# Patient Record
Sex: MALE | Race: WHITE | Employment: FULL TIME | URBAN - METROPOLITAN AREA
[De-identification: names, ages, dates, MRNs, and addresses within clinical notes are randomized per-mention and may not be internally consistent; named-entity substitution may affect disease eponyms.]

---

## 2018-05-15 PROCEDURE — 88305 TISSUE EXAM BY PATHOLOGIST: CPT | Performed by: INTERNAL MEDICINE

## 2019-01-28 ENCOUNTER — OFFICE VISIT (OUTPATIENT)
Dept: INTERNAL MEDICINE CLINIC | Facility: CLINIC | Age: 39
End: 2019-01-28
Payer: COMMERCIAL

## 2019-01-28 ENCOUNTER — APPOINTMENT (OUTPATIENT)
Dept: CT IMAGING | Facility: HOSPITAL | Age: 39
End: 2019-01-28
Attending: EMERGENCY MEDICINE
Payer: COMMERCIAL

## 2019-01-28 ENCOUNTER — HOSPITAL ENCOUNTER (EMERGENCY)
Facility: HOSPITAL | Age: 39
Discharge: HOME | End: 2019-01-28
Attending: EMERGENCY MEDICINE
Payer: COMMERCIAL

## 2019-01-28 VITALS
DIASTOLIC BLOOD PRESSURE: 80 MMHG | RESPIRATION RATE: 14 BRPM | SYSTOLIC BLOOD PRESSURE: 118 MMHG | OXYGEN SATURATION: 97 % | TEMPERATURE: 98 F | HEIGHT: 73 IN | HEART RATE: 73 BPM | BODY MASS INDEX: 27.7 KG/M2 | WEIGHT: 209 LBS

## 2019-01-28 VITALS
SYSTOLIC BLOOD PRESSURE: 134 MMHG | OXYGEN SATURATION: 100 % | HEART RATE: 72 BPM | DIASTOLIC BLOOD PRESSURE: 90 MMHG | TEMPERATURE: 98 F | RESPIRATION RATE: 16 BRPM

## 2019-01-28 DIAGNOSIS — R10.9 LEFT FLANK PAIN, CHRONIC: Primary | ICD-10-CM

## 2019-01-28 DIAGNOSIS — R10.12 ACUTE ABDOMINAL PAIN IN LEFT UPPER QUADRANT: Primary | ICD-10-CM

## 2019-01-28 DIAGNOSIS — G89.29 LEFT FLANK PAIN, CHRONIC: Primary | ICD-10-CM

## 2019-01-28 PROBLEM — F10.10 ALCOHOL ABUSE: Status: ACTIVE | Noted: 2019-01-28

## 2019-01-28 LAB
ALBUMIN SERPL-MCNC: 4.3 G/DL (ref 3.1–4.5)
ALBUMIN/GLOB SERPL: 1.3 {RATIO} (ref 1–2)
ALP LIVER SERPL-CCNC: 81 U/L (ref 45–117)
ALT SERPL-CCNC: 42 U/L (ref 17–63)
ANION GAP SERPL CALC-SCNC: 6 MMOL/L (ref 0–18)
AST SERPL-CCNC: 19 U/L (ref 15–41)
BASOPHILS # BLD AUTO: 0.03 X10(3) UL (ref 0–0.1)
BASOPHILS NFR BLD AUTO: 0.6 %
BILIRUB SERPL-MCNC: 0.4 MG/DL (ref 0.1–2)
BILIRUB UR QL STRIP.AUTO: NEGATIVE
BUN BLD-MCNC: 16 MG/DL (ref 8–20)
BUN/CREAT SERPL: 16.2 (ref 10–20)
CALCIUM BLD-MCNC: 8.9 MG/DL (ref 8.3–10.3)
CHLORIDE SERPL-SCNC: 108 MMOL/L (ref 101–111)
CLARITY UR REFRACT.AUTO: CLEAR
CO2 SERPL-SCNC: 26 MMOL/L (ref 22–32)
COLOR UR AUTO: YELLOW
CREAT BLD-MCNC: 0.99 MG/DL (ref 0.7–1.3)
EOSINOPHIL # BLD AUTO: 0.22 X10(3) UL (ref 0–0.3)
EOSINOPHIL NFR BLD AUTO: 4.1 %
ERYTHROCYTE [DISTWIDTH] IN BLOOD BY AUTOMATED COUNT: 11.9 % (ref 11.5–16)
GLOBULIN PLAS-MCNC: 3.2 G/DL (ref 2.8–4.4)
GLUCOSE BLD-MCNC: 94 MG/DL (ref 70–99)
GLUCOSE UR STRIP.AUTO-MCNC: NEGATIVE MG/DL
HCT VFR BLD AUTO: 40 % (ref 37–53)
HGB BLD-MCNC: 14.2 G/DL (ref 13–17)
IMMATURE GRANULOCYTE COUNT: 0.02 X10(3) UL (ref 0–1)
IMMATURE GRANULOCYTE RATIO %: 0.4 %
KETONES UR STRIP.AUTO-MCNC: NEGATIVE MG/DL
LEUKOCYTE ESTERASE UR QL STRIP.AUTO: NEGATIVE
LIPASE: 72 U/L (ref 73–393)
LYMPHOCYTES # BLD AUTO: 1.42 X10(3) UL (ref 0.9–4)
LYMPHOCYTES NFR BLD AUTO: 26.2 %
M PROTEIN MFR SERPL ELPH: 7.5 G/DL (ref 6.4–8.2)
MCH RBC QN AUTO: 29.9 PG (ref 27–33.2)
MCHC RBC AUTO-ENTMCNC: 35.5 G/DL (ref 31–37)
MCV RBC AUTO: 84.2 FL (ref 80–99)
MONOCYTES # BLD AUTO: 0.45 X10(3) UL (ref 0.1–1)
MONOCYTES NFR BLD AUTO: 8.3 %
NEUTROPHIL ABS PRELIM: 3.28 X10 (3) UL (ref 1.3–6.7)
NEUTROPHILS # BLD AUTO: 3.28 X10(3) UL (ref 1.3–6.7)
NEUTROPHILS NFR BLD AUTO: 60.4 %
NITRITE UR QL STRIP.AUTO: NEGATIVE
OSMOLALITY SERPL CALC.SUM OF ELEC: 291 MOSM/KG (ref 275–295)
PH UR STRIP.AUTO: 6 [PH] (ref 4.5–8)
PLATELET # BLD AUTO: 170 10(3)UL (ref 150–450)
POTASSIUM SERPL-SCNC: 3.8 MMOL/L (ref 3.6–5.1)
PROT UR STRIP.AUTO-MCNC: NEGATIVE MG/DL
RBC # BLD AUTO: 4.75 X10(6)UL (ref 4.3–5.7)
RBC UR QL AUTO: NEGATIVE
RED CELL DISTRIBUTION WIDTH-SD: 36.1 FL (ref 35.1–46.3)
SODIUM SERPL-SCNC: 140 MMOL/L (ref 136–144)
SP GR UR STRIP.AUTO: 1.01 (ref 1–1.03)
UROBILINOGEN UR STRIP.AUTO-MCNC: <2 MG/DL
WBC # BLD AUTO: 5.4 X10(3) UL (ref 4–13)

## 2019-01-28 PROCEDURE — 99285 EMERGENCY DEPT VISIT HI MDM: CPT

## 2019-01-28 PROCEDURE — 83690 ASSAY OF LIPASE: CPT | Performed by: EMERGENCY MEDICINE

## 2019-01-28 PROCEDURE — 74177 CT ABD & PELVIS W/CONTRAST: CPT | Performed by: EMERGENCY MEDICINE

## 2019-01-28 PROCEDURE — 96361 HYDRATE IV INFUSION ADD-ON: CPT

## 2019-01-28 PROCEDURE — 80053 COMPREHEN METABOLIC PANEL: CPT | Performed by: EMERGENCY MEDICINE

## 2019-01-28 PROCEDURE — 96360 HYDRATION IV INFUSION INIT: CPT

## 2019-01-28 PROCEDURE — 85025 COMPLETE CBC W/AUTO DIFF WBC: CPT | Performed by: EMERGENCY MEDICINE

## 2019-01-28 PROCEDURE — 99203 OFFICE O/P NEW LOW 30 MIN: CPT | Performed by: STUDENT IN AN ORGANIZED HEALTH CARE EDUCATION/TRAINING PROGRAM

## 2019-01-28 PROCEDURE — 81003 URINALYSIS AUTO W/O SCOPE: CPT | Performed by: EMERGENCY MEDICINE

## 2019-01-28 PROCEDURE — 99284 EMERGENCY DEPT VISIT MOD MDM: CPT

## 2019-01-28 NOTE — PROGRESS NOTES
HPI:    Patient ID: Wilfred Biggs is a 41year old male. Abdominal Pain   This is a recurrent problem. The current episode started more than 1 year ago. The onset quality is undetermined.  Episode frequency: The problem has been present for over 2 years Allergies:No Known Allergies   PHYSICAL EXAM:   Physical Exam   Constitutional: He is oriented to person, place, and time. He appears well-developed and well-nourished. HENT:   Head: Normocephalic and atraumatic.    Right Ear: External ear normal.   Left

## 2019-01-28 NOTE — ED PROVIDER NOTES
Patient Seen in: [facility] Emergency Department    History   Patient presents with:  Abdomen/Flank Pain (GI/)    Stated Complaint: abdominal pain    HPI    Patient is a pleasant 25-year-old male, presenting for evaluation of left-sided abdominal ABDOMEN: The abdomen is soft, nondistended, nontender. Bowel sounds present. SKIN: Skin is pink warm and dry. There are no rashes. EXTREMITIES: The patient moves all 4 extremities freely. No cyanosis, clubbing, or edema.    NEUROLOGIC: The patient is and lower quadrant abdominal pain past 2 weeks. CONTRAST USED:  100cc of Omnipaque 350  FINDINGS:  LIVER:  No enlargement, atrophy, abnormal density, or significant focal lesion. BILIARY:  No visible dilatation or calcification.   PANCREAS:  No lesion, f laboratory for further evaluation. An infusion of normal saline was initiated. Patient was observed while the laboratory and radiology studies were obtained.     Results of the patient's laboratory and radiology studies were reviewed and discussed with the

## 2019-01-28 NOTE — ED NOTES
Pt states he drinks about 5 shots of whiskey daily. MD was concerned about pancreatitis.   Pt states his last alcohol intake was a shot last night, but night before he had \"quite a bit\"

## 2019-01-28 NOTE — ED INITIAL ASSESSMENT (HPI)
Pt to ED from PMD office with c/o left sided abd pain ongoing x2 weeks, worse since yesterday. Sent for CT and blood work.

## 2022-01-17 PROBLEM — K57.30 DIVERTICULA, COLON: Status: ACTIVE | Noted: 2022-01-17

## 2023-11-10 ENCOUNTER — APPOINTMENT (OUTPATIENT)
Dept: GENERAL RADIOLOGY | Facility: HOSPITAL | Age: 43
End: 2023-11-10
Attending: EMERGENCY MEDICINE
Payer: COMMERCIAL

## 2023-11-10 ENCOUNTER — HOSPITAL ENCOUNTER (INPATIENT)
Facility: HOSPITAL | Age: 43
LOS: 2 days | Discharge: HOME HEALTH | End: 2023-11-13
Attending: EMERGENCY MEDICINE | Admitting: HOSPITALIST
Payer: COMMERCIAL

## 2023-11-10 DIAGNOSIS — S72.001A CLOSED FRACTURE OF RIGHT HIP, INITIAL ENCOUNTER (HCC): Primary | ICD-10-CM

## 2023-11-10 LAB
ALBUMIN SERPL-MCNC: 4.3 G/DL (ref 3.4–5)
ALBUMIN/GLOB SERPL: 1.2 {RATIO} (ref 1–2)
ALP LIVER SERPL-CCNC: 94 U/L
ALT SERPL-CCNC: 60 U/L
ANION GAP SERPL CALC-SCNC: 6 MMOL/L (ref 0–18)
AST SERPL-CCNC: 28 U/L (ref 15–37)
BASOPHILS # BLD AUTO: 0.06 X10(3) UL (ref 0–0.2)
BASOPHILS NFR BLD AUTO: 0.4 %
BILIRUB SERPL-MCNC: 0.5 MG/DL (ref 0.1–2)
BUN BLD-MCNC: 19 MG/DL (ref 9–23)
CALCIUM BLD-MCNC: 9.4 MG/DL (ref 8.5–10.1)
CHLORIDE SERPL-SCNC: 107 MMOL/L (ref 98–112)
CO2 SERPL-SCNC: 26 MMOL/L (ref 21–32)
CREAT BLD-MCNC: 1.19 MG/DL
EGFRCR SERPLBLD CKD-EPI 2021: 78 ML/MIN/1.73M2 (ref 60–?)
EOSINOPHIL # BLD AUTO: 0.15 X10(3) UL (ref 0–0.7)
EOSINOPHIL NFR BLD AUTO: 1 %
ERYTHROCYTE [DISTWIDTH] IN BLOOD BY AUTOMATED COUNT: 12 %
GLOBULIN PLAS-MCNC: 3.6 G/DL (ref 2.8–4.4)
GLUCOSE BLD-MCNC: 114 MG/DL (ref 70–99)
HCT VFR BLD AUTO: 40.8 %
HGB BLD-MCNC: 14.7 G/DL
IMM GRANULOCYTES # BLD AUTO: 0.09 X10(3) UL (ref 0–1)
IMM GRANULOCYTES NFR BLD: 0.6 %
LYMPHOCYTES # BLD AUTO: 1.5 X10(3) UL (ref 1–4)
LYMPHOCYTES NFR BLD AUTO: 10.4 %
MCH RBC QN AUTO: 29.8 PG (ref 26–34)
MCHC RBC AUTO-ENTMCNC: 36 G/DL (ref 31–37)
MCV RBC AUTO: 82.8 FL
MONOCYTES # BLD AUTO: 0.86 X10(3) UL (ref 0.1–1)
MONOCYTES NFR BLD AUTO: 6 %
NEUTROPHILS # BLD AUTO: 11.79 X10 (3) UL (ref 1.5–7.7)
NEUTROPHILS # BLD AUTO: 11.79 X10(3) UL (ref 1.5–7.7)
NEUTROPHILS NFR BLD AUTO: 81.6 %
OSMOLALITY SERPL CALC.SUM OF ELEC: 291 MOSM/KG (ref 275–295)
PLATELET # BLD AUTO: 181 10(3)UL (ref 150–450)
POTASSIUM SERPL-SCNC: 3.6 MMOL/L (ref 3.5–5.1)
PROT SERPL-MCNC: 7.9 G/DL (ref 6.4–8.2)
RBC # BLD AUTO: 4.93 X10(6)UL
SODIUM SERPL-SCNC: 139 MMOL/L (ref 136–145)
WBC # BLD AUTO: 14.5 X10(3) UL (ref 4–11)

## 2023-11-10 PROCEDURE — 99222 1ST HOSP IP/OBS MODERATE 55: CPT | Performed by: HOSPITALIST

## 2023-11-10 PROCEDURE — 73502 X-RAY EXAM HIP UNI 2-3 VIEWS: CPT | Performed by: EMERGENCY MEDICINE

## 2023-11-10 PROCEDURE — 71045 X-RAY EXAM CHEST 1 VIEW: CPT | Performed by: EMERGENCY MEDICINE

## 2023-11-10 PROCEDURE — 73552 X-RAY EXAM OF FEMUR 2/>: CPT | Performed by: EMERGENCY MEDICINE

## 2023-11-10 RX ORDER — HYDROMORPHONE HYDROCHLORIDE 1 MG/ML
0.5 INJECTION, SOLUTION INTRAMUSCULAR; INTRAVENOUS; SUBCUTANEOUS EVERY 30 MIN PRN
Status: DISCONTINUED | OUTPATIENT
Start: 2023-11-10 | End: 2023-11-11

## 2023-11-11 ENCOUNTER — APPOINTMENT (OUTPATIENT)
Dept: GENERAL RADIOLOGY | Facility: HOSPITAL | Age: 43
End: 2023-11-11
Attending: ORTHOPAEDIC SURGERY
Payer: COMMERCIAL

## 2023-11-11 ENCOUNTER — ANESTHESIA (OUTPATIENT)
Dept: SURGERY | Facility: HOSPITAL | Age: 43
End: 2023-11-11
Payer: COMMERCIAL

## 2023-11-11 ENCOUNTER — ANESTHESIA EVENT (OUTPATIENT)
Dept: SURGERY | Facility: HOSPITAL | Age: 43
End: 2023-11-11
Payer: COMMERCIAL

## 2023-11-11 DIAGNOSIS — S72.91XA FEMUR FRACTURE, RIGHT (HCC): Primary | ICD-10-CM

## 2023-11-11 LAB
BILIRUB UR QL STRIP.AUTO: NEGATIVE
COLOR UR AUTO: YELLOW
GLUCOSE UR STRIP.AUTO-MCNC: NORMAL MG/DL
LEUKOCYTE ESTERASE UR QL STRIP.AUTO: NEGATIVE
NITRITE UR QL STRIP.AUTO: NEGATIVE
PH UR STRIP.AUTO: 8 [PH] (ref 5–8)
PROT UR STRIP.AUTO-MCNC: 20 MG/DL
RBC UR QL AUTO: NEGATIVE
SP GR UR STRIP.AUTO: 1.03 (ref 1–1.03)
UROBILINOGEN UR STRIP.AUTO-MCNC: NORMAL MG/DL

## 2023-11-11 PROCEDURE — 99223 1ST HOSP IP/OBS HIGH 75: CPT | Performed by: ORTHOPAEDIC SURGERY

## 2023-11-11 PROCEDURE — 0QS604Z REPOSITION RIGHT UPPER FEMUR WITH INTERNAL FIXATION DEVICE, OPEN APPROACH: ICD-10-PCS | Performed by: ORTHOPAEDIC SURGERY

## 2023-11-11 PROCEDURE — 99232 SBSQ HOSP IP/OBS MODERATE 35: CPT | Performed by: STUDENT IN AN ORGANIZED HEALTH CARE EDUCATION/TRAINING PROGRAM

## 2023-11-11 PROCEDURE — 27236 TREAT THIGH FRACTURE: CPT | Performed by: ORTHOPAEDIC SURGERY

## 2023-11-11 PROCEDURE — 76000 FLUOROSCOPY <1 HR PHYS/QHP: CPT | Performed by: ORTHOPAEDIC SURGERY

## 2023-11-11 PROCEDURE — 3E0T3BZ INTRODUCTION OF ANESTHETIC AGENT INTO PERIPHERAL NERVES AND PLEXI, PERCUTANEOUS APPROACH: ICD-10-PCS | Performed by: ORTHOPAEDIC SURGERY

## 2023-11-11 DEVICE — IMPLANTABLE DEVICE: Type: IMPLANTABLE DEVICE | Site: HIP | Status: FUNCTIONAL

## 2023-11-11 DEVICE — IMPLANTABLE DEVICE: Type: IMPLANTABLE DEVICE | Site: HIP

## 2023-11-11 DEVICE — PIN STEINMAN SMOOTH 3/32 9: Type: IMPLANTABLE DEVICE | Site: HIP

## 2023-11-11 DEVICE — PIN STEINMAN SMOOTH 5/64 9: Type: IMPLANTABLE DEVICE | Site: HIP

## 2023-11-11 RX ORDER — TRANEXAMIC ACID 10 MG/ML
INJECTION, SOLUTION INTRAVENOUS AS NEEDED
Status: DISCONTINUED | OUTPATIENT
Start: 2023-11-11 | End: 2023-11-11 | Stop reason: SURG

## 2023-11-11 RX ORDER — ONDANSETRON 2 MG/ML
4 INJECTION INTRAMUSCULAR; INTRAVENOUS EVERY 6 HOURS PRN
Status: DISCONTINUED | OUTPATIENT
Start: 2023-11-11 | End: 2023-11-11 | Stop reason: HOSPADM

## 2023-11-11 RX ORDER — MEPERIDINE HYDROCHLORIDE 25 MG/ML
12.5 INJECTION INTRAMUSCULAR; INTRAVENOUS; SUBCUTANEOUS AS NEEDED
Status: DISCONTINUED | OUTPATIENT
Start: 2023-11-11 | End: 2023-11-11 | Stop reason: HOSPADM

## 2023-11-11 RX ORDER — BUPIVACAINE HYDROCHLORIDE AND EPINEPHRINE 2.5; 5 MG/ML; UG/ML
INJECTION, SOLUTION EPIDURAL; INFILTRATION; INTRACAUDAL; PERINEURAL AS NEEDED
Status: DISCONTINUED | OUTPATIENT
Start: 2023-11-11 | End: 2023-11-11 | Stop reason: SURG

## 2023-11-11 RX ORDER — NEOSTIGMINE METHYLSULFATE 1 MG/ML
INJECTION, SOLUTION INTRAVENOUS AS NEEDED
Status: DISCONTINUED | OUTPATIENT
Start: 2023-11-11 | End: 2023-11-11 | Stop reason: SURG

## 2023-11-11 RX ORDER — ONDANSETRON 2 MG/ML
INJECTION INTRAMUSCULAR; INTRAVENOUS AS NEEDED
Status: DISCONTINUED | OUTPATIENT
Start: 2023-11-11 | End: 2023-11-11 | Stop reason: SURG

## 2023-11-11 RX ORDER — ENEMA 19; 7 G/133ML; G/133ML
1 ENEMA RECTAL ONCE AS NEEDED
Status: DISCONTINUED | OUTPATIENT
Start: 2023-11-11 | End: 2023-11-11

## 2023-11-11 RX ORDER — ONDANSETRON 2 MG/ML
4 INJECTION INTRAMUSCULAR; INTRAVENOUS EVERY 6 HOURS PRN
Status: DISCONTINUED | OUTPATIENT
Start: 2023-11-11 | End: 2023-11-13

## 2023-11-11 RX ORDER — ENOXAPARIN SODIUM 100 MG/ML
40 INJECTION SUBCUTANEOUS DAILY
Status: DISCONTINUED | OUTPATIENT
Start: 2023-11-12 | End: 2023-11-13

## 2023-11-11 RX ORDER — POLYETHYLENE GLYCOL 3350 17 G/17G
17 POWDER, FOR SOLUTION ORAL DAILY PRN
Status: DISCONTINUED | OUTPATIENT
Start: 2023-11-11 | End: 2023-11-11

## 2023-11-11 RX ORDER — HYDROMORPHONE HYDROCHLORIDE 1 MG/ML
0.4 INJECTION, SOLUTION INTRAMUSCULAR; INTRAVENOUS; SUBCUTANEOUS EVERY 5 MIN PRN
Status: DISCONTINUED | OUTPATIENT
Start: 2023-11-11 | End: 2023-11-11 | Stop reason: HOSPADM

## 2023-11-11 RX ORDER — TRAMADOL HYDROCHLORIDE 50 MG/1
50 TABLET ORAL EVERY 6 HOURS SCHEDULED
Status: DISCONTINUED | OUTPATIENT
Start: 2023-11-11 | End: 2023-11-13

## 2023-11-11 RX ORDER — ACETAMINOPHEN 500 MG
500 TABLET ORAL EVERY 4 HOURS PRN
Status: DISCONTINUED | OUTPATIENT
Start: 2023-11-11 | End: 2023-11-11

## 2023-11-11 RX ORDER — PROCHLORPERAZINE EDISYLATE 5 MG/ML
5 INJECTION INTRAMUSCULAR; INTRAVENOUS EVERY 8 HOURS PRN
Status: DISCONTINUED | OUTPATIENT
Start: 2023-11-11 | End: 2023-11-13

## 2023-11-11 RX ORDER — BISACODYL 10 MG
10 SUPPOSITORY, RECTAL RECTAL
Status: DISCONTINUED | OUTPATIENT
Start: 2023-11-11 | End: 2023-11-13

## 2023-11-11 RX ORDER — PROCHLORPERAZINE EDISYLATE 5 MG/ML
5 INJECTION INTRAMUSCULAR; INTRAVENOUS EVERY 8 HOURS PRN
Status: DISCONTINUED | OUTPATIENT
Start: 2023-11-11 | End: 2023-11-11 | Stop reason: HOSPADM

## 2023-11-11 RX ORDER — CEFAZOLIN SODIUM/WATER 2 G/20 ML
2 SYRINGE (ML) INTRAVENOUS EVERY 8 HOURS
Status: COMPLETED | OUTPATIENT
Start: 2023-11-11 | End: 2023-11-12

## 2023-11-11 RX ORDER — SODIUM CHLORIDE 9 MG/ML
INJECTION, SOLUTION INTRAVENOUS CONTINUOUS
Status: DISCONTINUED | OUTPATIENT
Start: 2023-11-11 | End: 2023-11-13

## 2023-11-11 RX ORDER — KETOROLAC TROMETHAMINE 15 MG/ML
30 INJECTION, SOLUTION INTRAMUSCULAR; INTRAVENOUS EVERY 6 HOURS
Status: COMPLETED | OUTPATIENT
Start: 2023-11-11 | End: 2023-11-12

## 2023-11-11 RX ORDER — MELATONIN
3 NIGHTLY PRN
Status: DISCONTINUED | OUTPATIENT
Start: 2023-11-11 | End: 2023-11-13

## 2023-11-11 RX ORDER — HEPARIN SODIUM 5000 [USP'U]/ML
5000 INJECTION, SOLUTION INTRAVENOUS; SUBCUTANEOUS EVERY 12 HOURS SCHEDULED
Status: DISCONTINUED | OUTPATIENT
Start: 2023-11-11 | End: 2023-11-11

## 2023-11-11 RX ORDER — TIZANIDINE 2 MG/1
2 TABLET ORAL EVERY 6 HOURS PRN
Status: DISCONTINUED | OUTPATIENT
Start: 2023-11-11 | End: 2023-11-13

## 2023-11-11 RX ORDER — HYDROMORPHONE HYDROCHLORIDE 1 MG/ML
0.8 INJECTION, SOLUTION INTRAMUSCULAR; INTRAVENOUS; SUBCUTANEOUS EVERY 2 HOUR PRN
Status: DISCONTINUED | OUTPATIENT
Start: 2023-11-11 | End: 2023-11-13

## 2023-11-11 RX ORDER — CEFAZOLIN SODIUM 1 G/3ML
INJECTION, POWDER, FOR SOLUTION INTRAMUSCULAR; INTRAVENOUS AS NEEDED
Status: DISCONTINUED | OUTPATIENT
Start: 2023-11-11 | End: 2023-11-11 | Stop reason: SURG

## 2023-11-11 RX ORDER — SENNOSIDES 8.6 MG
17.2 TABLET ORAL NIGHTLY PRN
Status: DISCONTINUED | OUTPATIENT
Start: 2023-11-11 | End: 2023-11-11

## 2023-11-11 RX ORDER — LABETALOL HYDROCHLORIDE 5 MG/ML
5 INJECTION, SOLUTION INTRAVENOUS EVERY 5 MIN PRN
Status: DISCONTINUED | OUTPATIENT
Start: 2023-11-11 | End: 2023-11-11 | Stop reason: HOSPADM

## 2023-11-11 RX ORDER — ENEMA 19; 7 G/133ML; G/133ML
1 ENEMA RECTAL ONCE AS NEEDED
Status: DISCONTINUED | OUTPATIENT
Start: 2023-11-11 | End: 2023-11-13

## 2023-11-11 RX ORDER — KETAMINE HYDROCHLORIDE 50 MG/ML
INJECTION, SOLUTION, CONCENTRATE INTRAMUSCULAR; INTRAVENOUS AS NEEDED
Status: DISCONTINUED | OUTPATIENT
Start: 2023-11-11 | End: 2023-11-11 | Stop reason: SURG

## 2023-11-11 RX ORDER — HYDROMORPHONE HYDROCHLORIDE 1 MG/ML
0.6 INJECTION, SOLUTION INTRAMUSCULAR; INTRAVENOUS; SUBCUTANEOUS EVERY 5 MIN PRN
Status: DISCONTINUED | OUTPATIENT
Start: 2023-11-11 | End: 2023-11-11 | Stop reason: HOSPADM

## 2023-11-11 RX ORDER — OXYCODONE HYDROCHLORIDE 5 MG/1
5 TABLET ORAL EVERY 4 HOURS PRN
Status: DISCONTINUED | OUTPATIENT
Start: 2023-11-11 | End: 2023-11-13

## 2023-11-11 RX ORDER — SODIUM CHLORIDE, SODIUM LACTATE, POTASSIUM CHLORIDE, CALCIUM CHLORIDE 600; 310; 30; 20 MG/100ML; MG/100ML; MG/100ML; MG/100ML
INJECTION, SOLUTION INTRAVENOUS CONTINUOUS PRN
Status: DISCONTINUED | OUTPATIENT
Start: 2023-11-11 | End: 2023-11-11 | Stop reason: SURG

## 2023-11-11 RX ORDER — DEXAMETHASONE SODIUM PHOSPHATE 10 MG/ML
8 INJECTION, SOLUTION INTRAMUSCULAR; INTRAVENOUS ONCE
Status: COMPLETED | OUTPATIENT
Start: 2023-11-12 | End: 2023-11-12

## 2023-11-11 RX ORDER — OXYCODONE HYDROCHLORIDE 10 MG/1
10 TABLET ORAL EVERY 4 HOURS PRN
Status: DISCONTINUED | OUTPATIENT
Start: 2023-11-11 | End: 2023-11-13

## 2023-11-11 RX ORDER — DOCUSATE SODIUM 100 MG/1
100 CAPSULE, LIQUID FILLED ORAL 2 TIMES DAILY
Status: DISCONTINUED | OUTPATIENT
Start: 2023-11-11 | End: 2023-11-13

## 2023-11-11 RX ORDER — BISACODYL 10 MG
10 SUPPOSITORY, RECTAL RECTAL
Status: DISCONTINUED | OUTPATIENT
Start: 2023-11-11 | End: 2023-11-11

## 2023-11-11 RX ORDER — HYDROMORPHONE HYDROCHLORIDE 1 MG/ML
0.8 INJECTION, SOLUTION INTRAMUSCULAR; INTRAVENOUS; SUBCUTANEOUS EVERY 2 HOUR PRN
Status: DISCONTINUED | OUTPATIENT
Start: 2023-11-11 | End: 2023-11-11

## 2023-11-11 RX ORDER — HYDRALAZINE HYDROCHLORIDE 20 MG/ML
5 INJECTION INTRAMUSCULAR; INTRAVENOUS
Status: DISCONTINUED | OUTPATIENT
Start: 2023-11-11 | End: 2023-11-11 | Stop reason: HOSPADM

## 2023-11-11 RX ORDER — MIDAZOLAM HYDROCHLORIDE 1 MG/ML
1 INJECTION INTRAMUSCULAR; INTRAVENOUS EVERY 5 MIN PRN
Status: DISCONTINUED | OUTPATIENT
Start: 2023-11-11 | End: 2023-11-11 | Stop reason: HOSPADM

## 2023-11-11 RX ORDER — ACETAMINOPHEN 500 MG
1000 TABLET ORAL 4 TIMES DAILY
Status: DISPENSED | OUTPATIENT
Start: 2023-11-11 | End: 2023-11-13

## 2023-11-11 RX ORDER — BUPIVACAINE HYDROCHLORIDE AND EPINEPHRINE 2.5; 5 MG/ML; UG/ML
INJECTION, SOLUTION EPIDURAL; INFILTRATION; INTRACAUDAL; PERINEURAL AS NEEDED
Status: DISCONTINUED | OUTPATIENT
Start: 2023-11-11 | End: 2023-11-11

## 2023-11-11 RX ORDER — SODIUM CHLORIDE, SODIUM LACTATE, POTASSIUM CHLORIDE, CALCIUM CHLORIDE 600; 310; 30; 20 MG/100ML; MG/100ML; MG/100ML; MG/100ML
INJECTION, SOLUTION INTRAVENOUS CONTINUOUS
Status: DISCONTINUED | OUTPATIENT
Start: 2023-11-11 | End: 2023-11-11 | Stop reason: HOSPADM

## 2023-11-11 RX ORDER — HYDROMORPHONE HYDROCHLORIDE 1 MG/ML
0.4 INJECTION, SOLUTION INTRAMUSCULAR; INTRAVENOUS; SUBCUTANEOUS EVERY 2 HOUR PRN
Status: DISCONTINUED | OUTPATIENT
Start: 2023-11-11 | End: 2023-11-13

## 2023-11-11 RX ORDER — ROCURONIUM BROMIDE 10 MG/ML
INJECTION, SOLUTION INTRAVENOUS AS NEEDED
Status: DISCONTINUED | OUTPATIENT
Start: 2023-11-11 | End: 2023-11-11 | Stop reason: SURG

## 2023-11-11 RX ORDER — DIPHENHYDRAMINE HYDROCHLORIDE 50 MG/ML
12.5 INJECTION INTRAMUSCULAR; INTRAVENOUS AS NEEDED
Status: DISCONTINUED | OUTPATIENT
Start: 2023-11-11 | End: 2023-11-11 | Stop reason: HOSPADM

## 2023-11-11 RX ORDER — SENNOSIDES 8.6 MG
17.2 TABLET ORAL NIGHTLY
Status: DISCONTINUED | OUTPATIENT
Start: 2023-11-11 | End: 2023-11-13

## 2023-11-11 RX ORDER — HYDROMORPHONE HYDROCHLORIDE 1 MG/ML
0.2 INJECTION, SOLUTION INTRAMUSCULAR; INTRAVENOUS; SUBCUTANEOUS EVERY 5 MIN PRN
Status: DISCONTINUED | OUTPATIENT
Start: 2023-11-11 | End: 2023-11-11 | Stop reason: HOSPADM

## 2023-11-11 RX ORDER — ACETAMINOPHEN 500 MG
1000 TABLET ORAL ONCE AS NEEDED
Status: DISCONTINUED | OUTPATIENT
Start: 2023-11-11 | End: 2023-11-11 | Stop reason: HOSPADM

## 2023-11-11 RX ORDER — HYDROMORPHONE HYDROCHLORIDE 1 MG/ML
0.2 INJECTION, SOLUTION INTRAMUSCULAR; INTRAVENOUS; SUBCUTANEOUS EVERY 2 HOUR PRN
Status: DISCONTINUED | OUTPATIENT
Start: 2023-11-11 | End: 2023-11-11

## 2023-11-11 RX ORDER — GLYCOPYRROLATE 0.2 MG/ML
INJECTION, SOLUTION INTRAMUSCULAR; INTRAVENOUS AS NEEDED
Status: DISCONTINUED | OUTPATIENT
Start: 2023-11-11 | End: 2023-11-11 | Stop reason: SURG

## 2023-11-11 RX ORDER — KETOROLAC TROMETHAMINE 30 MG/ML
INJECTION, SOLUTION INTRAMUSCULAR; INTRAVENOUS AS NEEDED
Status: DISCONTINUED | OUTPATIENT
Start: 2023-11-11 | End: 2023-11-11 | Stop reason: SURG

## 2023-11-11 RX ORDER — DOXEPIN HYDROCHLORIDE 50 MG/1
1 CAPSULE ORAL DAILY
Status: DISCONTINUED | OUTPATIENT
Start: 2023-11-12 | End: 2023-11-13

## 2023-11-11 RX ORDER — HYDROMORPHONE HYDROCHLORIDE 1 MG/ML
0.4 INJECTION, SOLUTION INTRAMUSCULAR; INTRAVENOUS; SUBCUTANEOUS EVERY 2 HOUR PRN
Status: DISCONTINUED | OUTPATIENT
Start: 2023-11-11 | End: 2023-11-11

## 2023-11-11 RX ORDER — NALOXONE HYDROCHLORIDE 0.4 MG/ML
80 INJECTION, SOLUTION INTRAMUSCULAR; INTRAVENOUS; SUBCUTANEOUS AS NEEDED
Status: DISCONTINUED | OUTPATIENT
Start: 2023-11-11 | End: 2023-11-11 | Stop reason: HOSPADM

## 2023-11-11 RX ORDER — POLYETHYLENE GLYCOL 3350 17 G/17G
17 POWDER, FOR SOLUTION ORAL DAILY PRN
Status: DISCONTINUED | OUTPATIENT
Start: 2023-11-11 | End: 2023-11-13

## 2023-11-11 RX ORDER — DEXAMETHASONE SODIUM PHOSPHATE 4 MG/ML
VIAL (ML) INJECTION AS NEEDED
Status: DISCONTINUED | OUTPATIENT
Start: 2023-11-11 | End: 2023-11-11 | Stop reason: SURG

## 2023-11-11 RX ADMIN — ONDANSETRON 4 MG: 2 INJECTION INTRAMUSCULAR; INTRAVENOUS at 16:11:00

## 2023-11-11 RX ADMIN — ROCURONIUM BROMIDE 10 MG: 10 INJECTION, SOLUTION INTRAVENOUS at 14:42:00

## 2023-11-11 RX ADMIN — NEOSTIGMINE METHYLSULFATE 5 MG: 1 INJECTION, SOLUTION INTRAVENOUS at 16:27:00

## 2023-11-11 RX ADMIN — CEFAZOLIN SODIUM 2 G: 1 INJECTION, POWDER, FOR SOLUTION INTRAMUSCULAR; INTRAVENOUS at 14:07:00

## 2023-11-11 RX ADMIN — GLYCOPYRROLATE 0.8 MG: 0.2 INJECTION, SOLUTION INTRAMUSCULAR; INTRAVENOUS at 16:27:00

## 2023-11-11 RX ADMIN — KETOROLAC TROMETHAMINE 30 MG: 30 INJECTION, SOLUTION INTRAMUSCULAR; INTRAVENOUS at 13:59:00

## 2023-11-11 RX ADMIN — ROCURONIUM BROMIDE 10 MG: 10 INJECTION, SOLUTION INTRAVENOUS at 14:30:00

## 2023-11-11 RX ADMIN — ROCURONIUM BROMIDE 10 MG: 10 INJECTION, SOLUTION INTRAVENOUS at 16:04:00

## 2023-11-11 RX ADMIN — ROCURONIUM BROMIDE 50 MG: 10 INJECTION, SOLUTION INTRAVENOUS at 13:59:00

## 2023-11-11 RX ADMIN — DEXAMETHASONE SODIUM PHOSPHATE 8 MG: 4 MG/ML VIAL (ML) INJECTION at 13:59:00

## 2023-11-11 RX ADMIN — SODIUM CHLORIDE, SODIUM LACTATE, POTASSIUM CHLORIDE, CALCIUM CHLORIDE: 600; 310; 30; 20 INJECTION, SOLUTION INTRAVENOUS at 16:28:00

## 2023-11-11 RX ADMIN — KETAMINE HYDROCHLORIDE 25 MG: 50 INJECTION, SOLUTION, CONCENTRATE INTRAMUSCULAR; INTRAVENOUS at 13:59:00

## 2023-11-11 RX ADMIN — SODIUM CHLORIDE, SODIUM LACTATE, POTASSIUM CHLORIDE, CALCIUM CHLORIDE: 600; 310; 30; 20 INJECTION, SOLUTION INTRAVENOUS at 13:56:00

## 2023-11-11 RX ADMIN — BUPIVACAINE HYDROCHLORIDE AND EPINEPHRINE 30 ML: 2.5; 5 INJECTION, SOLUTION EPIDURAL; INFILTRATION; INTRACAUDAL; PERINEURAL at 14:04:00

## 2023-11-11 RX ADMIN — ROCURONIUM BROMIDE 10 MG: 10 INJECTION, SOLUTION INTRAVENOUS at 15:29:00

## 2023-11-11 RX ADMIN — ROCURONIUM BROMIDE 10 MG: 10 INJECTION, SOLUTION INTRAVENOUS at 14:46:00

## 2023-11-11 RX ADMIN — ROCURONIUM BROMIDE 10 MG: 10 INJECTION, SOLUTION INTRAVENOUS at 15:08:00

## 2023-11-11 RX ADMIN — TRANEXAMIC ACID 1000 MG: 10 INJECTION, SOLUTION INTRAVENOUS at 13:58:00

## 2023-11-11 NOTE — PLAN OF CARE
A&Ox 4. VSS. On room air. . Refusing telemetry monitor. Ankle pumps encouraged. NPO. Pain controlled with IV meds. Ortho to see. PT/OT eval. Patient updated on plan of care. Safety precautions maintained. Call light within reach.

## 2023-11-11 NOTE — ED QUICK NOTES
Pt declined MRSA nasal swab. Pt states \"I always gets sick when something's gets stuck up in my nose. \"

## 2023-11-11 NOTE — ED INITIAL ASSESSMENT (HPI)
Patient arrives with ems from home after fall, per patient he tripped while playing with his dogs while wearing new shoes. Patient now with cramping like pain to right hip/buttocks area.

## 2023-11-11 NOTE — PLAN OF CARE
Patient alert and oriented x4. VSS on RA. Pain controlled with IV PRN pain meds. Denies n/t. SCDs in place, ankle pumps encouraged. Pt rolling side to side. NPO. No c/o n/v. Voiding freely in urinal. IVF per order. Plan: possible surgery with Dr. Jaimie Márquez today     Update [phone]: pt arrived back on floor after surgery. Denies n/t. Gauze and tegaderm dressing x2 to right hip and x2 to right knee, CDI. Ice applied.

## 2023-11-11 NOTE — OCCUPATIONAL THERAPY NOTE
OT order received and chart reviewed. S/p fall, imaging showing R femur fracture. Ortho consultation pending. Hold therapy until pt is seen by ortho.

## 2023-11-11 NOTE — OPERATIVE REPORT
Operative Note    Patient Name/: Jose Manuel Aviles 1980  Date: 2023  Location: [facility]  Preoperative Diagnosis: Acute closed traumatic displaced right hip femoral neck fracture  Postoperative Diagnosis: Same  Operation: Open reduction internal fixation right hip femoral neck fracture  Primary Surgeon: Brad Goss  Assistant:  Jamari Florentino surgical assistant first assist. Please note a skilled surgical assistant was necessary for this case in order to assist with patient positioning, prepping, draping, incision, exposure, implant placement, wound closure. Indications: 81-year-old male who sustained the above injury from a fall  Surgical Findings: Displaced fracture reduced and stabilized with 3 cannulated screws  Operative Report: After informed consent, right lower extremity was marked. Patient was brought to the operating room where general anesthesia was induced. Ancef was given, 2 g, within 1 hour of incision. Tranexamic acid was given as well. Right lower extremity was prepped and draped in sterile fashion. Timeout was performed. Next fluoroscopy was used to place a distal femoral traction pin with 20 pounds of weight hung off the end of the table. We took AP and lateral x-rays of the hip and noted the reduction to be not quite anatomic, therefore determined that we would perform an open approach to obtain an anatomic reduction. Anterior approach to the hip was performed, with incision 2 fingerbreadths distal and 2 fingerbreadths lateral to the ASIS carried down along the tensor muscle. Incision was carried down to the fascia which was incised in line with the tensor. The tensor was mobilized lateral, and a deep retractor was placed between this muscle and the rectus. Precapsular fat was dissected through, and the anterior hip joint capsule was identified. Retractors were placed above and below the femoral neck, and the capsule was teed open.   Immediate rush of fracture hematoma was evacuated. Following this, I was able to place retractors above and below the femoral neck, and the fracture was identified. I next performed a lateral approach to the proximal femur, through skin and subcutaneous tissue down to the IT band which was incised. I placed a guidewire just distal to the vastus ridge center of the femoral neck on the AP and lateral image. This was left short of the fracture. I then placed a K wire as a joystick through the proximal aspect of the fracture femoral neck just distal to the femoral head. Using the Radha bow as well as our 2 guidewires, I was able to reduce and hold a anatomic reduction. I verified this through direct visualization of the anterior and inferior femoral neck with the fracture line anatomically reduced. Following this reduction I did drive the lateral wire across the femoral neck up into the femoral head. Verified on both the AP and lateral x-ray that we did have an appropriate reduction. Following this, 3 K wires were driven, 1 central on the lateral and inferior on the AP, 1 central on the AP and posterior on the lateral, and 1 superior on the AP and anterior on the lateral.  I used the parallel guide in order to make these wires as parallel as possible. Following drilling these wires up into the subchondral bone, we measured and placed partially-threaded cannulated screws across, first anterior, then inferior, and lastly posterior. These all had excellent tight purchase into the bone. Following tightening of the screws, K wires were removed, final x-rays were taken, wounds were irrigated and closed.   Anesthesia: General plus block  Complications: None  Specimens: None  Blood loss: 150 mL  Fluids: See anesthesia report  Implants: Synthes 7.3 mm partially-threaded cancellous screws x3  Drains: None  Condition: Good  Disposition: Floor    -Touchdown weightbearing right lower extremity, PT OT  -DVT prophylaxis mechanical plus Lovenox x2 weeks followed by aspirin x2 weeks  -Pain control with oral meds  -Perioperative Walker Goss MD, [address] Orthopedic Surgery  Phone [phone]  Fax [phone]

## 2023-11-11 NOTE — ANESTHESIA PROCEDURE NOTES
Regional Block    Date/Time: 11/11/2023 2:01 PM    Performed by: Dae Al MD  Authorized by: Dae Al MD      General Information and Staff    Start Time:  11/11/2023 2:01 PM  End Time:  11/11/2023 2:05 PM  Anesthesiologist:  Dae Al MD  Performed by: Anesthesiologist  Patient Location:  OR    Block Placement: Post Induction  Site Identification: surface landmarks    Block site/laterality marked before start: site marked  Reason for Block: at surgeon's request and post-op pain management    Preanesthetic Checklist: 2 patient identifers, IV checked, site marked, risks and benefits discussed, monitors and equipment checked, pre-op evaluation, timeout performed, anesthesia consent, sterile technique used, no prohibitive neurological deficits and no local skin infection at insertion site      Procedure Details    Patient Position:  Supine  Prep: ChloraPrep    Monitoring:  Cardiac monitor, continuous pulse ox, blood pressure cuff and heart rate  Block Type:  Fascia iliaca  Laterality:  Right  Injection Technique:  Single-shot    Needle    Needle Type:  Tuohy  Needle Gauge:  17 G  Needle Localization:  Anatomical landmarks              Assessment    Injection Assessment:  Negative resistance, negative aspiration for heme, incremental injection and low pressure  Heart Rate Change: No    - Patient tolerated block procedure well without evidence of immediate block related complications. Medications  11/11/2023 2:01 PM      Additional Comments    Medication:  Bupivacaine 0.25% 30mL  with 1:200,000 epi  Successful 1st pass.     Tyler Knight MD  [facility]

## 2023-11-11 NOTE — PHYSICAL THERAPY NOTE
PT order received and chart reviewed. S/p fall, imaging showing R femur fracture. Ortho consultation pending. Hold therapy until pt is seen by ortho. Pt seen by ortho with possible surgery today. PT to sign off at this time, RN made aware. Pt will need new orders post op.

## 2023-11-11 NOTE — ANESTHESIA PROCEDURE NOTES
Airway  Date/Time: 11/11/2023 2:00 PM  Urgency: elective    Airway not difficult    General Information and Staff    Patient location during procedure: OR  Anesthesiologist: Wil Loera MD  Performed: anesthesiologist   Performed by: Wil Loera MD  Authorized by: Wil Loera MD      Indications and Patient Condition  Indications for airway management: anesthesia  Sedation level: deep  Preoxygenated: yes  Patient position: sniffing  Mask difficulty assessment: 1 - vent by mask    Final Airway Details  Final airway type: endotracheal airway      Successful airway: ETT  Cuffed: yes   Successful intubation technique: direct laryngoscopy  Endotracheal tube insertion site: oral  Blade: Rhoda  Blade size: #4  ETT size (mm): 7.5    Cormack-Lehane Classification: grade I - full view of glottis  Placement verified by: capnometry   Measured from: teeth  ETT to teeth (cm): 22  Number of attempts at approach: 1  Number of other approaches attempted: 0    Additional Comments  Easy intubation. No evidence of facial, dental, or oral trauma.     Sunitha Lane MD  [facility]

## 2023-11-11 NOTE — ED QUICK NOTES
Orders for admission, patient is aware of plan and ready to go upstairs. Any questions, please call ED RN Leah Chapa at extension [phone].      Patient Covid vaccination status: Unvaccinated     COVID Test Ordered in ED: None    COVID Suspicion at Admission: N/A    Running Infusions:  None    Mental Status/LOC at time of transport: a/ox4    Other pertinent information:   CIWA score: N/A   NIH score:  N/A

## 2023-11-12 ENCOUNTER — APPOINTMENT (OUTPATIENT)
Dept: GENERAL RADIOLOGY | Facility: HOSPITAL | Age: 43
End: 2023-11-12
Attending: ORTHOPAEDIC SURGERY
Payer: COMMERCIAL

## 2023-11-12 LAB
ANION GAP SERPL CALC-SCNC: 5 MMOL/L (ref 0–18)
BASOPHILS # BLD AUTO: 0.02 X10(3) UL (ref 0–0.2)
BASOPHILS NFR BLD AUTO: 0.2 %
BUN BLD-MCNC: 17 MG/DL (ref 9–23)
CALCIUM BLD-MCNC: 8.2 MG/DL (ref 8.5–10.1)
CHLORIDE SERPL-SCNC: 110 MMOL/L (ref 98–112)
CO2 SERPL-SCNC: 25 MMOL/L (ref 21–32)
CREAT BLD-MCNC: 1.08 MG/DL
EGFRCR SERPLBLD CKD-EPI 2021: 88 ML/MIN/1.73M2 (ref 60–?)
EOSINOPHIL # BLD AUTO: 0.04 X10(3) UL (ref 0–0.7)
EOSINOPHIL NFR BLD AUTO: 0.4 %
ERYTHROCYTE [DISTWIDTH] IN BLOOD BY AUTOMATED COUNT: 12.1 %
GLUCOSE BLD-MCNC: 121 MG/DL (ref 70–99)
HCT VFR BLD AUTO: 36.8 %
HGB BLD-MCNC: 12.7 G/DL
IMM GRANULOCYTES # BLD AUTO: 0.04 X10(3) UL (ref 0–1)
IMM GRANULOCYTES NFR BLD: 0.4 %
LYMPHOCYTES # BLD AUTO: 1.07 X10(3) UL (ref 1–4)
LYMPHOCYTES NFR BLD AUTO: 9.6 %
MAGNESIUM SERPL-MCNC: 1.9 MG/DL (ref 1.6–2.6)
MCH RBC QN AUTO: 29.1 PG (ref 26–34)
MCHC RBC AUTO-ENTMCNC: 34.5 G/DL (ref 31–37)
MCV RBC AUTO: 84.2 FL
MONOCYTES # BLD AUTO: 0.97 X10(3) UL (ref 0.1–1)
MONOCYTES NFR BLD AUTO: 8.7 %
NEUTROPHILS # BLD AUTO: 8.99 X10 (3) UL (ref 1.5–7.7)
NEUTROPHILS # BLD AUTO: 8.99 X10(3) UL (ref 1.5–7.7)
NEUTROPHILS NFR BLD AUTO: 80.7 %
OSMOLALITY SERPL CALC.SUM OF ELEC: 293 MOSM/KG (ref 275–295)
PLATELET # BLD AUTO: 160 10(3)UL (ref 150–450)
POTASSIUM SERPL-SCNC: 3.6 MMOL/L (ref 3.5–5.1)
RBC # BLD AUTO: 4.37 X10(6)UL
SODIUM SERPL-SCNC: 140 MMOL/L (ref 136–145)
WBC # BLD AUTO: 11.1 X10(3) UL (ref 4–11)

## 2023-11-12 PROCEDURE — 73502 X-RAY EXAM HIP UNI 2-3 VIEWS: CPT | Performed by: ORTHOPAEDIC SURGERY

## 2023-11-12 PROCEDURE — 99024 POSTOP FOLLOW-UP VISIT: CPT | Performed by: ORTHOPAEDIC SURGERY

## 2023-11-12 PROCEDURE — 99232 SBSQ HOSP IP/OBS MODERATE 35: CPT | Performed by: STUDENT IN AN ORGANIZED HEALTH CARE EDUCATION/TRAINING PROGRAM

## 2023-11-12 RX ORDER — ACETAMINOPHEN 500 MG
1000 TABLET ORAL EVERY 8 HOURS
Qty: 90 TABLET | Refills: 0 | Status: SHIPPED | OUTPATIENT
Start: 2023-11-12 | End: 2023-11-27

## 2023-11-12 RX ORDER — PSEUDOEPHEDRINE HCL 30 MG
100 TABLET ORAL 2 TIMES DAILY PRN
Qty: 30 CAPSULE | Refills: 0 | Status: SHIPPED | OUTPATIENT
Start: 2023-11-12

## 2023-11-12 RX ORDER — ENOXAPARIN SODIUM 100 MG/ML
40 INJECTION SUBCUTANEOUS DAILY
Qty: 4.8 ML | Refills: 0 | Status: SHIPPED | OUTPATIENT
Start: 2023-11-13 | End: 2023-11-12

## 2023-11-12 RX ORDER — TRAMADOL HYDROCHLORIDE 50 MG/1
50 TABLET ORAL EVERY 8 HOURS
Qty: 45 TABLET | Refills: 0 | Status: SHIPPED | OUTPATIENT
Start: 2023-11-12 | End: 2023-11-27

## 2023-11-12 RX ORDER — ENOXAPARIN SODIUM 100 MG/ML
40 INJECTION SUBCUTANEOUS DAILY
Qty: 5.6 ML | Refills: 0 | Status: SHIPPED | OUTPATIENT
Start: 2023-11-13 | End: 2023-11-27

## 2023-11-12 RX ORDER — OXYCODONE HYDROCHLORIDE 5 MG/1
TABLET ORAL EVERY 4 HOURS PRN
Qty: 40 TABLET | Refills: 0 | Status: SHIPPED | OUTPATIENT
Start: 2023-11-12

## 2023-11-12 RX ORDER — ASPIRIN 81 MG/1
81 TABLET ORAL 2 TIMES DAILY
Qty: 28 TABLET | Refills: 0 | Status: SHIPPED | OUTPATIENT
Start: 2023-11-26 | End: 2023-11-12

## 2023-11-12 NOTE — PLAN OF CARE
Patient alert and oriented x4. VSS on RA. Pain controlled with scheduled pain medications. C/o slight numbness to right thigh. X2 gauze and tegaderm to right hip, CDI. X2 gauze and tegaderm to right knee, CDI. Ice applied as needed. SCDs in place, ankle pumps encouraged, IS encouraged. Pt rolling side to side. Touch down weight bearing.  Tolerating diet, no c/o n/v. Voiding freely in urinal.     Plan: PT/OT eval

## 2023-11-12 NOTE — PLAN OF CARE
A/o x4. RA//IS encouraged. IV SL. Gauze and tegaderm to RLE cdi. Pt states RLE numb from waist down to knee. Able to do ankle pumps unable to lift leg. Regular diet denies n/v. Voiding. LBM 11/9. Pain managed with block and scheduled pain medication. TTWB. PT/OT to see. POC updated with pt. All safety measures in place. Call light within reach instructed pt to call for help or assistance.

## 2023-11-12 NOTE — PHYSICAL THERAPY NOTE
PHYSICAL THERAPY EVALUATION - INPATIENT     Room Number: 384/384-A  Evaluation Date: 11/12/2023  Type of Evaluation: Initial  Physician Order: PT Eval and Treat    Presenting Problem: closed fracture R hip  Co-Morbidities : NA  Reason for Therapy: Mobility Dysfunction and Discharge Planning    History related to current admission: Patient is a 44year old male admitted on 11/10/2023 from home for s/p fall. Pt diagnosed with closed fracture R hip. X-ray R hip 11/10/23:  CONCLUSION:  Subcapital right femur fracture. Operative report 11/11/23:  Operation: Open reduction internal fixation right hip femoral neck fracture     ASSESSMENT   PT orders received, chart reviewed, and RN approved PT session. Pt in bed and agreeable to PT. Pt educated in TDWB R LE and PT instructed and demonstrated technique. In this PT evaluation, the patient presents with the following impairments dec strength, dec balance, dec endurance and dec overall functional mobility. These impairments and comorbidities manifest themselves as functional limitations in independent bed mobility, transfers, and gait. The patient is below baseline and would benefit from skilled inpatient PT to address the above deficits to assist patient in returning to prior to level of function. Functional outcome measures completed include [facility]. The -PAC '6-Clicks' Inpatient Basic Mobility Short Form was completed and this patient is demonstrating a Approx Degree of Impairment: 46.58%  degree of impairment in mobility. Research supports that patients with this level of impairment may benefit from DC recommendation to home with HHPT/OT. Also recommend pt staying on the main floor, avoid the flight of stairs to bedrooms, use powder room on main floor for sponge baths. Pt in agreement. DISCHARGE RECOMMENDATIONS  PT Discharge Recommendations: Home with home health PT/OT    PLAN  PT Treatment Plan: Bed mobility; Body mechanics; Endurance; Energy conservation; Family education;Gait training;Strengthening;Stair training;Transfer training;Balance training  Rehab Potential : Good  Frequency (Obs): 5x/week  Number of Visits to Meet Established Goals: 2      CURRENT GOALS    Goal #1 Patient is able to demonstrate supine - sit EOB @ level: modified independent     Goal #2 Patient is able to demonstrate transfers Sit to/from Stand at assistance level: modified independent     Goal #3 Patient is able to ambulate 150 feet with assist device: walker - rolling at assistance level: modified independent     Goal #4 Pt to ascend/descend 1 stoop Mod I with RW   Goal #5    Goal #6    Goal Comments: Goals established on 2023    HOME SITUATION  Type of Home: House   Home Layout: Two level  Stairs to Enter : 2  Railing: Yes  Stairs to Bedroom: 14  Railing: Yes    Lives With: Spouse  Drives: Yes  Patient Owned Equipment: None       Prior Level of [place]: pt independent with ADL, IADL, ambulation, driving, and works full time for [employer]. Pt lives with SO.     SUBJECTIVE  Pt is pleasant and cooperative. OBJECTIVE  Precautions: Bed/chair alarm  Fall Risk: High fall risk    WEIGHT BEARING RESTRICTION  Weight Bearing Restriction: R lower extremity        R Lower Extremity: Touch Down Weight Bearing       PAIN ASSESSMENT  Ratin  Location: R hip/thigh  Management Techniques:  Activity promotion;Relaxation;Repositioning    COGNITION  Overall Cognitive Status:  WFL - within functional limits    RANGE OF MOTION AND STRENGTH ASSESSMENT  Upper extremity ROM and strength are within functional limits     Lower extremity ROM is within functional limits  WNL throughout except limited R hip/knee due to surgery    Lower extremity strength is within functional limits  L LE 5/5, pt able to complete R knee extension in sitting      BALANCE  Static Sitting: Fair +  Dynamic Sitting: Fair +  Static Standing: Poor +  Dynamic Standing: Poor    ADDITIONAL TESTS ACTIVITY TOLERANCE  Pulse: 71  Heart Rate Source: Monitor                   O2 WALK       NEUROLOGICAL FINDINGS                        AM-PAC '6-Clicks' INPATIENT SHORT FORM - BASIC MOBILITY  How much difficulty does the patient currently have. .. Patient Difficulty: Turning over in bed (including adjusting bedclothes, sheets and blankets)?: A Little   Patient Difficulty: Sitting down on and standing up from a chair with arms (e.g., wheelchair, bedside commode, etc.): A Little   Patient Difficulty: Moving from lying on back to sitting on the side of the bed?: A Little   How much help from another person does the patient currently need. .. Help from Another: Moving to and from a bed to a chair (including a wheelchair)?: A Little   Help from Another: Need to walk in hospital room?: A Little   Help from Another: Climbing 3-5 steps with a railing?: A Little       AM-PAC Score:  Raw Score: 18   Approx Degree of Impairment: 46.58%   Standardized Score (AM-PAC Scale): 43.63   CMS Modifier (G-Code): CK    FUNCTIONAL ABILITY STATUS  Gait Assessment   Functional Mobility/Gait Assessment  Gait Assistance: Supervision  Distance (ft): 150  Assistive Device: Rolling walker  Pattern:  (TDWB R LE)  Stairs: Stairs;Stoop/curb  How Many Stairs: 4  Device: 1 Crutch  Assist: Minimal assist  Pattern: Ascend and Descend  Ascend and Descend : Step to  Stoop/Curb: CGA    Skilled Therapy Provided     Bed Mobility:  Rolling: NT  Supine to sit: Mod I   Sit to supine: NT     Transfer Mobility:  Sit to stand: CGA initially, then supervision   Stand to sit: supervision  Gait = CGA intiially, then supervision    Therapist's Comments: Pt in bed and agreeable to PT. Pt Mod I for bed mobility. Pt instructed in proper hand placement and integration of RW with sit<>Stand, instructed in gait sequencing and TDWB R LE. CGA for sit<>stand and ambulation. Pt cued for upright posture, and neutral R LE alignment.  Pt instructed in gait sequencing on the stairs, sequencing for stoop training, and car transfer training. Pt able to ascend 4 steps with Min A, Max A for descending steps. Instructed pt in ascend/descend stoop with RW, CGA (there is a step down into the living room. ). instructed pt in car transfer technique. Pt demonstrates good safety awareness, and demonstrates good activity pacing skills. Discussed symptom management techniques such as rest, positioning and use of cryotherapy. Inc time spent on discussion regarding home environment, and recommend pt to stay on main floor for safety reasons. Pt's description of the stairs, is very difficult to navigate especially with TDWB status on R LE. Pt states he has a hospital bed of his grandparents that he can use. Discussed ways to enhance ease and safety in the home. Recommend  home PT/OT to assist with recommendations. Pt in agreement. Exercise/Education Provided:  Bed mobility  Body mechanics  Energy conservation  Functional activity tolerated  Gait training  Transfer training    Patient End of Session: Up in chair;Needs met;Call light within reach;RN aware of session/findings; All patient questions and concerns addressed;SCDs in place; Alarm set; Discussed recommendations with /      Patient Evaluation Complexity Level:  History Moderate - 1 or 2 personal factors and/or co-morbidities   Examination of body systems Low - addressing 1-2 elements   Clinical Presentation Low - Stable   Clinical Decision Making Low - Stable       PT Session Time: 60 minutes  Gait Trainin minutes  Therapeutic Activity: 20 minutes  Neuromuscular Re-education: 0 minutes  Therapeutic Exercise: 0 minutes 50 75 30

## 2023-11-13 VITALS
DIASTOLIC BLOOD PRESSURE: 92 MMHG | OXYGEN SATURATION: 97 % | BODY MASS INDEX: 27 KG/M2 | WEIGHT: 210 LBS | RESPIRATION RATE: 18 BRPM | HEART RATE: 81 BPM | TEMPERATURE: 98 F | SYSTOLIC BLOOD PRESSURE: 133 MMHG

## 2023-11-13 PROCEDURE — 99239 HOSP IP/OBS DSCHRG MGMT >30: CPT | Performed by: STUDENT IN AN ORGANIZED HEALTH CARE EDUCATION/TRAINING PROGRAM

## 2023-11-13 PROCEDURE — 99024 POSTOP FOLLOW-UP VISIT: CPT | Performed by: ORTHOPAEDIC SURGERY

## 2023-11-13 RX ORDER — SORBITOL SOLUTION 70 %
30 SOLUTION, ORAL MISCELLANEOUS ONCE
Status: COMPLETED | OUTPATIENT
Start: 2023-11-13 | End: 2023-11-13

## 2023-11-13 NOTE — PROGRESS NOTES
[facility] Orth[facility] Progress Note    Subjective: no acute events. Pain controlled. Working with therapy on clearing stairs. Tolerating diet and voiding.  Hasn't had BM in 2 days    Objective: awake and alert, sitting up in bedside chair  RLE dressing CDI, no strikethrough      Assessment/Plan: 44year old male postop day #1 status post ORIF R hip femoral neck fx.  -Touchdown weightbearing right lower extremity, PT OT  -DVT prophylaxis mechanical plus Lovenox x2 weeks followed by aspirin x2 weeks  -Pain control with oral meds  -Perioperative Ancef  Disposition: fu therapy progress for dc home    Nicolasa Malave MD, [address] Orthopedic Surgery  Phone [phone]  Fax [phone]

## 2023-11-13 NOTE — PROGRESS NOTES
[facility] Progress Note    Subjective: no acute events. Pain controlled.  Reports weakness with knee extension still, but no numbness in lower extremity and hip flexion is doing fine    Objective: awake and alert, being wheeled to therapy  Weakness with quad activation  Sensation intact throughout thigh and leg - anterior, medial and lateral      Assessment/Plan: 44year old male postop day #2 status post ORIF R hip femoral neck fx.  -Touchdown weightbearing right lower extremity, PT OT  -DVT prophylaxis mechanical plus Lovenox x2 weeks followed by aspirin x2 weeks  -Pain control with oral meds  -Perioperative Ancef completed  -advised monitoring for quad strength return - patient with active hip flexion, no other motor deficit, and sensation intact in distal femoral nerve distribution  Disposition: fu therapy progress for dc home    Reddy Stover MD, [address] Orthopedic Surgery  Phone [phone]  Fax [phone]

## 2023-11-13 NOTE — CM/SW NOTE
Department  notified of request for [facility], aidin referrals started. Assigned CM/SW to follow up with pt/family on further discharge planning.       Terrence WALDRON [facility]

## 2023-11-13 NOTE — PHYSICAL THERAPY NOTE
Attempted to see pt for f/u therapy session. RN gave clearance. Pt reporting that his leg still is \"paralyzed\", writer discussed that what he is feeling is secondary to nerve block he received in OR. Pt also requesting time for pain medication that he just received to kick in. Will re-attempt as schedule allows later today.

## 2023-11-13 NOTE — PHYSICAL THERAPY NOTE
PHYSICAL THERAPY TREATMENT NOTE - INPATIENT    Room Number: 384/384-A     Session: 1/2     Number of Visits to Meet Established Goals: 2    Presenting Problem: closed fracture R hip  Co-Morbidities : h/o alcohol abuse    History related to current admission: Patient is a 44year old male admitted on 11/10/2023 from home for s/p fall. Pt diagnosed with closed fracture R hip. X-ray R hip 11/10/23:  CONCLUSION:  Subcapital right femur fracture. Operative report 11/11/23:  Operation: Open reduction internal fixation right hip femoral neck fracture   ASSESSMENT   Patient continues to present with the following limitations: incr anxiety with movement and fear that his leg is paralyzed due to inability to extend knee or flex hip, decr static and standing balance, decr activity tolerance/endurance, decr functional mobility. These are barriers to independent bed mobility, transfers, ambulation, and stair navigation. At time of discharge, rec to PT and intermittent supervision. Discussed at length with pt discharge recommendation and need for extra equipment. Pt reports his father is buying him a lift recliner. Reports he may have a commode from grandfather, but he also may buy one on [address]. Reports he has a RW. Plans to remain on first floor of home. Most concerned about ability to get into his home/complete car transfer- wondering about transportation options for discharge- SW made aware. DISCHARGE RECOMMENDATIONS  PT Discharge Recommendations: Home with home health PT/OT     PLAN  PT Treatment Plan: Bed mobility; Body mechanics; Endurance; Energy conservation; Family education;Gait training;Strengthening;Stair training;Transfer training;Balance training  Rehab Potential : Good  Frequency (Obs): 5x/week    CURRENT GOALS     Goal #1 Patient is able to demonstrate supine - sit EOB @ level: modified independent      Goal #2 Patient is able to demonstrate transfers Sit to/from Stand at assistance level: modified independent      Goal #3 Patient is able to ambulate 150 feet with assist device: walker - rolling at assistance level: modified independent      Goal #4 Pt to ascend/descend 1 stoop Mod I with RW   Goal #5     Goal #6     Goal Comments: Goals established on 11/12/2023 11/13/2023 all goals ongoing      SUBJECTIVE  \"I don't want to leave until I can move my leg and until I have a BM. \"    OBJECTIVE  Precautions: Bed/chair alarm    WEIGHT BEARING RESTRICTION  Weight Bearing Restriction: R lower extremity        R Lower Extremity: Touch Down Weight Bearing       PAIN ASSESSMENT   Rating: Unable to rate  Location: R hip/thigh  Management Techniques: Activity promotion; Body mechanics;Repositioning    BALANCE                                                                                                                       Static Sitting: Fair +  Dynamic Sitting: Fair           Static Standing: Fair -  Dynamic Standing: Poor +    ACTIVITY TOLERANCE                         O2 WALK         AM-PAC '6-Clicks' INPATIENT SHORT FORM - BASIC MOBILITY  How much difficulty does the patient currently have. .. Patient Difficulty: Turning over in bed (including adjusting bedclothes, sheets and blankets)?: A Little   Patient Difficulty: Sitting down on and standing up from a chair with arms (e.g., wheelchair, bedside commode, etc.): A Little   Patient Difficulty: Moving from lying on back to sitting on the side of the bed?: A Little   How much help from another person does the patient currently need. ..    Help from Another: Moving to and from a bed to a chair (including a wheelchair)?: A Little   Help from Another: Need to walk in hospital room?: A Little   Help from Another: Climbing 3-5 steps with a railing?: A Lot       AM-PAC Score:  Raw Score: 17   Approx Degree of Impairment: 50.57%   Standardized Score (AM-PAC Scale): 42.13   CMS Modifier (G-Code): CK    FUNCTIONAL ABILITY STATUS  Gait Assessment   Functional Mobility/Gait Assessment  Gait Assistance: Contact guard assist;Supervision  Distance (ft): 15  Assistive Device: Rolling walker  Pattern:  (TDWB R LE)  Stairs: Stoop/curb  How Many Stairs: not tested today, pt declines  Device:  (Not tested)  Assist:  (not tested)  Pattern: Ascend and Descend  Ascend and Descend : Step to  Stoop/Curb: CGA    Skilled Therapy Provided    Bed Mobility:  Rolling: NT   Supine<>Sit: NT   Sit<>Supine: NT     Transfer Mobility:  Sit<>Stand: CGA to RW- verbal cues for hand placement    Stand<>Sit: CGA   Gait: CGA/ supervision x 15 feet w/ RW maintaining TDWB (preferred to be NWB)     Therapist's Comments: Patient presents sitting up in bedside chair. Discussed role and goal of therapy session today. Pt expresses concerns about discharging home as he does not feel he is ready. Discussed at length safe home navigation, recommended equipment, recommendation of HHPT/OT and intermittent supervision. Pt reporting that he will be sleeping in recliner chair at home- deferred practicing bed mobility. Sit to stand multiple times from bedside chair completed at [facility] to RW, able to maintain TDWB status. Ambulated within room about 15 feet total w/ RW at CGA/supervision. Pt rolled to therapy gym. Demonstrated stair navigation- pt stating he is too scared to attempt and will not use a crutch to assist. Pt then stated he could go in front door where there are 2 stoop steps that RW could fit on. Demonstrated navigation of stoop step with RW- pt able to backwards hop up stoop step at [facility]. Attempted to practice car transfer, but pt stating the car would be much lower than what we would practice- still encouraged to complete it for the sake of practicing the sequencing, but deferred car transfer. Discussed having family bring pillow to elevate seat height, moving chair all the way back, reclining chair for more depth. Also discussed possibility of sitting upright in long sitting position in backseat.  Pt still reporting significant concerns about car transfer and navigating either set of steps to get into home. Discussed possibility of medicar transport- SW will discuss further. If patient remains in house will continue to see him for IP PT interventions. RN and SW aware. Patient End of Session: Up in chair;Needs met;Call light within reach;RN aware of session/findings; All patient questions and concerns addressed; Alarm set; Discussed recommendations with /    PT Session Time: 37 minutes  Gait Trainin minutes  Therapeutic Activity: 17 minutes  Therapeutic Exercise:  minutes   Neuromuscular Re-education:  minutes

## 2023-11-13 NOTE — CM/SW NOTE
Met with pt to discuss DC planning. Pt worked with PT/OT and currently sitting in chair. Pt confirmed plan for DC to his home. His girlfriend will assist.  He chose [facility] for [facility] services. Pt requesting medicar transport. Informed him of costs for transportation not covered by insurance. Medicar scheduled for 7pm.  Pt stated he will have friends available at the home to assist with 2 steps into the building. PCS form completed. Updated RN. Kira reserved in [address]. No further DC needs at this time. / to remain available for support and/or discharge planning.      Pedrito Bucio LCSW  Discharge Planner  [phone]

## 2023-11-13 NOTE — PLAN OF CARE
POD1 R ORIF. AxO4. VSS on RA. Refusing tele - SCD/lovenox. Denies nausea, voids w/o issue. Sched pain meds given for pain control. Dressing x4 to LLE C/D/I. TDWB on LLE. PT/OT to reeval - rec HH. Patient voiced concern for help at home, to endorse to day shift and SW. Partner @ bedside. Updated on POC. Safety measures placed. Care ongoing.

## 2023-11-13 NOTE — CM/SW NOTE
11/13/23 1100   CM/SW Referral Data   Referral Source Social Work (self-referral)   Reason for Referral Discharge planning   Informant EMR;Clinical Staff Member;Patient; Father   Patient Info   Patient's Current Mental Status at Time of Assessment Alert;Oriented   Patient lives with Spouse/Significant other   Patient Status Prior to Admission   Independent with ADLs and Mobility Yes   Discharge Needs   Anticipated D/C needs Home health care   Choice of Post-Acute Provider   Informed patient of right to choose their preferred provider Yes   List of appropriate post-acute services provided to patient/family with quality data Yes   Information given to Patient       HOME SITUATION per PT eval:  Type of Home: House   Home Layout: Two level  Stairs to Enter : 2  Railing: Yes  Stairs to Bedroom: 14  Railing: Yes     Lives With: Spouse  Drives: Yes  Patient Owned Equipment: None     Prior Level of [place] per PT eval: pt independent with ADL, IADL, ambulation, driving, and works full time for [employer]. Pt lives with SO. Patient is a 45 y/o man admitted s/p fall with hip fracture now s/p ORIF. PT/OT recommending HHC. Informed by RN that pt requesting hospital bed for home. Referrals sent to [facility] providers via [address] by [facility]. Met with pt and his father at bedside to discuss DC planning. Discussed therapy recommendation for [facility] and list of accepting agencies given. Pt lives with his significant other, however she works and is not always available to assist pt at home. Pt also has multiple steps at home. Pt concerned that he needs assistance with lifting his leg as he has not regained full sensation post op and feels it is still \"dead weight. \"  He stated his goal of being able to get in/out of bed and to/from toilet independently prior to DC. Pt requesting hospital bed for home, but discussed that pt not anticipated to meet criteria for insurance coverage for hospital bed.   Reviewed options for private pay rental.  Pt's father stating only location for bed would be in the kitchen. Pt stated he could stay at his grandmother's home, which is all one level. He would be able to sleep in recliner chair. Grandmother has bedside commode he would be able to use if needed. Only one step to enter home. Pt continues to express concerns about limited mobility and quad activation. Encouraged pt to discuss concerns with MD/PT. Plan for therapy sessions today. Information about private pay caregivers and private pay NH stay briefly reviewed. SW to follow pt's progress for further DC planning. / to remain available for support and/or discharge planning.      Lyndsay Turner [facility]  Discharge Planner  [phone]

## 2023-11-20 NOTE — DISCHARGE SUMMARY
Dio [facility]rd HOSPITALIST  DISCHARGE SUMMARY     Carol Villagran Patient Status:  Inpatient    1980 MRN [id number]   [facility] 3SW-A Attending No att. providers found   Hosp Day # 2 PCP Brenden Arora MD     Date of Admission: 11/10/2023  Date of Discharge:  2023     Discharge Disposition: [facility]    Discharge Diagnosis:  #Right femoral fracture s/p fall   Ortho on Cs, s/p ORIF   Anti emetics  Pain control  PT/OT  DC planning pending re evaluation byt PT  Bowel care    History of Present Illness:   Carol Villagran is a 44year old male with no past medical history presents the hospital after playing with his dog and using oversized shoes. Patient essentially tripped and fell hard on his right hip. Patient denies any loss of conscious. Patient immunity started having right hip pain. Patient otherwise denies any chest pain, short of breath, fevers, chills, nausea, vomiting, diarrhea. Brief Synopsis:   Admitted for Right sided femoral fx s/p ORIF 2023. DC home in stable condtion. Lace+ Score: 25  59-90 High Risk  29-58 Medium Risk  0-28   Low Risk       TCM Follow-Up Recommendation:  LACE 29-58: Moderate Risk of readmission after discharge from the hospital.      Procedures during hospitalization:   ORIF    Incidental or significant findings and recommendations (brief descriptions):  no    Lab/Test results pending at Discharge:   no    Consultants:  ORtho    Discharge Medication List:     Discharge Medications        START taking these medications        Instructions Prescription details   acetaminophen 500 MG Tabs  Commonly known as: Tylenol Extra Strength      Take 2 tablets (1,000 mg total) by mouth every 8 (eight) hours for 15 days. Stop taking on: 2023  Quantity: 90 tablet  Refills: 0     docusate sodium 100 MG Caps  Commonly known as: COLACE      Take 100 mg by mouth 2 (two) times daily as needed for constipation.    Quantity: 30 capsule  Refills: 0     enoxaparin 40 MG/0.4ML Sosy  Commonly known as: Lovenox      Inject 0.4 mL (40 mg total) into the skin daily for 14 days. Stop taking on: 2023  Quantity: 5.6 mL  Refills: 0     oxyCODONE 5 MG Tabs      Take 1-2 tablets (5-10 mg total) by mouth every 4 (four) hours as needed. Quantity: 40 tablet  Refills: 0     traMADol 50 MG Tabs  Commonly known as: Ultram      Take 1 tablet (50 mg total) by mouth every 8 (eight) hours for 15 days. Stop taking on: 2023  Quantity: 45 tablet  Refills: 0               Where to Get Your Medications        Please  your prescriptions at the location directed by your doctor or nurse    Bring a paper prescription for each of these medications  acetaminophen 500 MG Tabs  docusate sodium 100 MG Caps  enoxaparin 40 MG/0.4ML Sosy  oxyCODONE 5 MG Tabs  traMADol 50 MG Tabs         [place]    Follow-up appointment:   Joy Nath [address] [place] [id number] [phone]    Go on 2023  For postop follow up    Appointments for Next 30 Days 2023 - 2023        Date Arrival Time Visit Type Length Department Provider     2023 11:40 AM  POST OP VISIT [[id number]] 20 min [address] [facility],[address], [address] [place] [address] Israel Benitez MD    Patient Instructions:         Location Instructions:     Masks are optional for all patients and visitors, unless otherwise indicated. Vital signs:       Physical Exam:    General: No acute distress   Lungs: clear to auscultation  Cardiovascular: S1, S2  Abdomen: Soft      -----------------------------------------------------------------------------------------------  PATIENT DISCHARGE INSTRUCTIONS: See electronic chart    Teresita Barboza MD    Total time spent on discharge plannin minutes     The [facility] makes medical notes like these available to patients in the interest of transparency.  Please be advised this is a medical document. Medical documents are intended to carry relevant information, facts as evident, and the clinical opinion of the practitioner. The medical note is intended as peer to peer communication and may appear blunt or direct. It is written in medical language and may contain abbreviations or verbiage that are unfamiliar.

## 2023-11-22 ENCOUNTER — TELEPHONE (OUTPATIENT)
Dept: ORTHOPEDICS CLINIC | Facility: CLINIC | Age: 43
End: 2023-11-22

## 2023-11-22 DIAGNOSIS — Z87.81 S/P RIGHT HIP FRACTURE: Primary | ICD-10-CM

## 2023-11-22 NOTE — TELEPHONE ENCOUNTER
Xrays ordered for upcoming appointment     Patient aware to arrive 15-20 minutes earlier to compete images

## 2023-11-27 ENCOUNTER — HOSPITAL ENCOUNTER (OUTPATIENT)
Dept: GENERAL RADIOLOGY | Age: 43
Discharge: HOME | End: 2023-11-27
Attending: ORTHOPAEDIC SURGERY
Payer: COMMERCIAL

## 2023-11-27 ENCOUNTER — OFFICE VISIT (OUTPATIENT)
Dept: ORTHOPEDICS CLINIC | Facility: CLINIC | Age: 43
End: 2023-11-27
Payer: COMMERCIAL

## 2023-11-27 DIAGNOSIS — Z87.81 S/P RIGHT HIP FRACTURE: Primary | ICD-10-CM

## 2023-11-27 DIAGNOSIS — Z87.81 S/P RIGHT HIP FRACTURE: ICD-10-CM

## 2023-11-27 PROCEDURE — 73502 X-RAY EXAM HIP UNI 2-3 VIEWS: CPT | Performed by: ORTHOPAEDIC SURGERY

## 2023-11-27 PROCEDURE — 99024 POSTOP FOLLOW-UP VISIT: CPT | Performed by: ORTHOPAEDIC SURGERY

## 2023-11-27 RX ORDER — TRAMADOL HYDROCHLORIDE 50 MG/1
50 TABLET ORAL EVERY 8 HOURS PRN
Qty: 30 TABLET | Refills: 0 | Status: SHIPPED | OUTPATIENT
Start: 2023-11-27

## 2023-11-27 NOTE — PROGRESS NOTES
[facility] Progress Note    Subjective: Patient returns to clinic 2 weeks postop from ORIF right hip femoral neck fracture. He reports that he is taking occasional tramadol for pain. He has been working with therapy. He has been holding off on weightbearing on the extremity. No drainage from the incisions. He is working with home therapy. He states he is doing therapy on his own also, total of 3 times per day. He states that his strength and function has gotten better, he is able to straighten the knee and flex the hip, but this does still limit him somewhat. Objective: Patient is present in a wheelchair. Active hip flexion with 3+ to 4 out of 5 strength,'s appears somewhat limited due to pain. He has 5 out of 5 knee extension/quad contraction strength. Incisions are well-healed, staples removed. Imaging: X-rays of the right hip personally viewed, independently interpreted and radiology report read. Demonstrates anatomic reduction of right femoral neck fracture, stable positioning of hardware without cut out or backing out. Assessment/Plan: 69-year-old male 2 weeks postop status post ORIF right hip femoral neck fracture. Incisions healing well, staples removed today. Pain is controlled with tramadol, refilled today. He is done with Lovenox, advised starting aspirin 162 mg daily for the next 2 weeks. May get the incisions wet in the shower starting in 2 days.   He will continue with home therapy, we will have him follow-up in 4 weeks with repeat nonweightbearing x-rays of the right hip AP and frog-leg lateral.    Dewayne Cardoso MD, [address] Orthopedic Surgery  Phone [phone]  Fax [phone]

## 2023-12-01 ENCOUNTER — HOSPITAL ENCOUNTER (OUTPATIENT)
Dept: ULTRASOUND IMAGING | Facility: HOSPITAL | Age: 43
Discharge: HOME | End: 2023-12-01
Attending: ORTHOPAEDIC SURGERY
Payer: COMMERCIAL

## 2023-12-01 ENCOUNTER — TELEPHONE (OUTPATIENT)
Dept: ORTHOPEDICS CLINIC | Facility: CLINIC | Age: 43
End: 2023-12-01

## 2023-12-01 ENCOUNTER — MED REC SCAN ONLY (OUTPATIENT)
Dept: ORTHOPEDICS CLINIC | Facility: CLINIC | Age: 43
End: 2023-12-01

## 2023-12-01 DIAGNOSIS — R09.89 SUSPECTED DVT (DEEP VEIN THROMBOSIS): ICD-10-CM

## 2023-12-01 DIAGNOSIS — R09.89 SUSPECTED DVT (DEEP VEIN THROMBOSIS): Primary | ICD-10-CM

## 2023-12-01 PROCEDURE — 93971 EXTREMITY STUDY: CPT | Performed by: ORTHOPAEDIC SURGERY

## 2023-12-01 NOTE — TELEPHONE ENCOUNTER
To MD for [facility] with patient. Pain and swelling in calf since Wednesday night- feels a bit better today, however the vein in the back of the leg is \"plump\", warm and sore to the touch. Advised him to continue to elevate and that he is not to massage the area. No fever- no chills,states he feels better a bit today- took one baby asa this morning. STAT order R/O DVT placed    Advised if patient cannot get in today to let us know.         [facility] Scheduling at [phone]

## 2023-12-01 NOTE — TELEPHONE ENCOUNTER
Patient is requesting a call back from a nurse. States he has calf pain with redness and is worried it is deep vein thrombosis. Pleaser advise.

## 2023-12-01 NOTE — TELEPHONE ENCOUNTER
Future Appointments   Date Time Provider Lily Santoi   12/1/2023  3:00 PM [facility] RM [facility]   12/27/2023 11:00 AM Johana Ball MD [facility]     Patient has appt today at 3 pm.for stat doppler

## 2023-12-22 ENCOUNTER — TELEPHONE (OUTPATIENT)
Dept: ORTHOPEDICS CLINIC | Facility: CLINIC | Age: 43
End: 2023-12-22

## 2023-12-22 DIAGNOSIS — Z87.81 S/P RIGHT HIP FRACTURE: Primary | ICD-10-CM

## 2023-12-22 NOTE — TELEPHONE ENCOUNTER
Xr orders placed     Please call patient to arrive 15-20 minutes earlier to complete images before appointment

## 2023-12-27 ENCOUNTER — HOSPITAL ENCOUNTER (OUTPATIENT)
Dept: GENERAL RADIOLOGY | Age: 43
Discharge: HOME | End: 2023-12-27
Attending: ORTHOPAEDIC SURGERY
Payer: COMMERCIAL

## 2023-12-27 ENCOUNTER — OFFICE VISIT (OUTPATIENT)
Dept: ORTHOPEDICS CLINIC | Facility: CLINIC | Age: 43
End: 2023-12-27
Payer: COMMERCIAL

## 2023-12-27 DIAGNOSIS — Z87.81 S/P RIGHT HIP FRACTURE: Primary | ICD-10-CM

## 2023-12-27 DIAGNOSIS — Z87.81 S/P RIGHT HIP FRACTURE: ICD-10-CM

## 2023-12-27 PROCEDURE — 73502 X-RAY EXAM HIP UNI 2-3 VIEWS: CPT | Performed by: ORTHOPAEDIC SURGERY

## 2023-12-27 PROCEDURE — 99024 POSTOP FOLLOW-UP VISIT: CPT | Performed by: ORTHOPAEDIC SURGERY

## 2023-12-27 RX ORDER — OXYCODONE HYDROCHLORIDE 5 MG/1
5 TABLET ORAL EVERY 8 HOURS PRN
Qty: 20 TABLET | Refills: 0 | Status: SHIPPED | OUTPATIENT
Start: 2023-12-27

## 2023-12-27 NOTE — PROGRESS NOTES
[facility] Progress Note    Subjective: Patient returns to clinic 6 weeks postop from ORIF right hip femoral neck fracture. He reports he continues to improve and is doing well. He is not having much in the way of pain, only taking oxycodone usually once per day. He has been doing home therapy. He has maintained nonweightbearing/touchdown weightbearing. He does state that he has improved his ability to flex his hip, but does still note some lack of motion compared to the left hip. Objective: Patient appears comfortable, very pleasant. He can actively hip flex around 90 degrees. Imaging: X-rays of the right hip and pelvis personally viewed, independently interpreted and radiology report read. Demonstrates unchanged positioning of reduced femoral neck fracture, hardware unchanged in position      Assessment/Plan: 52-year-old male 6 weeks postop status post ORIF right femoral neck fracture. Patient continues to improve. X-rays with stable fracture reduction. Counseled on progression to weightbearing as tolerated. We did discuss taking steps towards achieving ambulation without assist device, and starting with weightbearing with a walker. Did refill oxycodone today per patient request.  No restrictions on the incision. Outpatient therapy prescription provided.   Will have him follow-up in 6 weeks with repeat x-rays of the right hip AP and frog-leg lateral.    Parish Dover MD, [address] Orthopedic Surgery  Phone [phone]  Fax [phone]

## 2023-12-28 ENCOUNTER — PATIENT MESSAGE (OUTPATIENT)
Dept: ORTHOPEDICS CLINIC | Facility: CLINIC | Age: 43
End: 2023-12-28

## 2023-12-28 ENCOUNTER — TELEPHONE (OUTPATIENT)
Dept: ORTHOPEDICS CLINIC | Facility: CLINIC | Age: 43
End: 2023-12-28

## 2023-12-28 NOTE — TELEPHONE ENCOUNTER
Pt called requesting a note for work stating that pt is unable to drive to work but able to work remotely. Once letter is completed, please send to pt mychart. Thanks!

## 2023-12-28 NOTE — TELEPHONE ENCOUNTER
From: Ousmane Chavira  To: Rah Rodriguez  Sent: 12/28/2023 3:34 PM CST  Subject: Doctors note for work    Hello,   I am requesting a note for my employer saying i cant drive yet so they let me work remotely.  I can even screenshot [employer]    Any assistance is appreciated

## 2023-12-28 NOTE — TELEPHONE ENCOUNTER
Please see patient request     Patient was last seen yesterday for S/P right hip fracture     Plan was to work towards ambulation without assist device and start weight bearing with a walker and follow up in 6 weeks with repeat x-rays

## 2024-01-24 ENCOUNTER — MED REC SCAN ONLY (OUTPATIENT)
Dept: ORTHOPEDICS CLINIC | Facility: CLINIC | Age: 44
End: 2024-01-24

## 2024-02-06 ENCOUNTER — TELEPHONE (OUTPATIENT)
Facility: CLINIC | Age: 44
End: 2024-02-06

## 2024-02-06 DIAGNOSIS — Z87.81 S/P RIGHT HIP FRACTURE: Primary | ICD-10-CM

## 2024-02-06 NOTE — TELEPHONE ENCOUNTER
Xr's ordered for upcoming appointment    Patient aware to arrive early to complete images before seeing provider

## 2024-02-07 ENCOUNTER — OFFICE VISIT (OUTPATIENT)
Dept: ORTHOPEDICS CLINIC | Facility: CLINIC | Age: 44
End: 2024-02-07
Payer: COMMERCIAL

## 2024-02-07 ENCOUNTER — HOSPITAL ENCOUNTER (OUTPATIENT)
Dept: GENERAL RADIOLOGY | Age: 44
Discharge: HOME | End: 2024-02-07
Attending: ORTHOPAEDIC SURGERY
Payer: COMMERCIAL

## 2024-02-07 DIAGNOSIS — Z87.81 S/P RIGHT HIP FRACTURE: Primary | ICD-10-CM

## 2024-02-07 DIAGNOSIS — Z87.81 S/P RIGHT HIP FRACTURE: ICD-10-CM

## 2024-02-07 PROCEDURE — 99024 POSTOP FOLLOW-UP VISIT: CPT | Performed by: ORTHOPAEDIC SURGERY

## 2024-02-07 PROCEDURE — 73502 X-RAY EXAM HIP UNI 2-3 VIEWS: CPT | Performed by: ORTHOPAEDIC SURGERY

## 2024-02-07 NOTE — PROGRESS NOTES
[facility] Progress Note    Subjective: Patient returns 3 months postop from ORIF right hip femoral neck fracture.  He states he has been full weightbearing and not having pain.  He only notes stiffness with the hip.  He has not been working with physical therapy, not doing stretches or exercises on his own.  He does feel like he has a little bit of an awkward gait.  No other concerns.    Objective: Patient ambulates with abducted right hip.  Passive adduction of the right hip does not pass neutral prior to rotating the pelvis.  Left hip can adduct at least 10 to 15 degrees.  Incisions are well-healed.      Imaging: X-rays of the right hip personally viewed, independently interpreted and radiology report read.  Demonstrates healing femoral neck fracture, bridging callus and increased osseous density over the previously visible fracture line.      Assessment/Plan: 43-year-old male 3 months postop status post ORIF right hip femoral neck fracture.  Fracture radiographically healed at this point.  Counseled on continued activities as tolerated.  Emphasized the importance of stretching/exercise program in order to improve flexibility and gait.  He has not followed through with physical therapy, did not want to go to one of the big box therapy locations but that is all his insurance would approve.  Did encourage patient that he can work on these exercises on his own and provided the "[employer]" handout, however also did provide a therapy prescription for him in case he decides to proceed with formal therapy.  Will focus on abductor stretching and gait.  We would have him follow-up at 1 year from date of surgery with repeat x-rays, subsequently 2 years postop with x-rays to evaluate for any signs of avascular necrosis.  He will contact us sooner if needed.    Nazario Whittington MD, [facility]  [facility] Orthopedic Surgery  Phone [phone]  Fax [phone]

## 2024-03-01 ENCOUNTER — MED REC SCAN ONLY (OUTPATIENT)
Dept: ORTHOPEDICS CLINIC | Facility: CLINIC | Age: 44
End: 2024-03-01

## 2024-04-02 ENCOUNTER — MED REC SCAN ONLY (OUTPATIENT)
Dept: ORTHOPEDICS CLINIC | Facility: CLINIC | Age: 44
End: 2024-04-02

## 2024-06-28 ENCOUNTER — MED REC SCAN ONLY (OUTPATIENT)
Dept: ORTHOPEDICS CLINIC | Facility: CLINIC | Age: 44
End: 2024-06-28

## 2024-10-25 ENCOUNTER — TELEPHONE (OUTPATIENT)
Dept: ORTHOPEDICS CLINIC | Facility: CLINIC | Age: 44
End: 2024-10-25

## 2024-10-25 DIAGNOSIS — Z87.81 S/P RIGHT HIP FRACTURE: Primary | ICD-10-CM

## 2024-10-25 NOTE — TELEPHONE ENCOUNTER
Ordered per MD notes~    We would have him follow-up at 1 year from date of surgery with repeat x-rays

## 2024-10-25 NOTE — TELEPHONE ENCOUNTER
Patient called and is about to reach his 1 year lalo and would like xrays of his right hip added to epic to get it done.

## 2024-10-29 ENCOUNTER — HOSPITAL ENCOUNTER (OUTPATIENT)
Dept: GENERAL RADIOLOGY | Age: 44
Discharge: HOME | End: 2024-10-29
Attending: ORTHOPAEDIC SURGERY
Payer: COMMERCIAL

## 2024-10-29 DIAGNOSIS — Z87.81 S/P RIGHT HIP FRACTURE: ICD-10-CM

## 2024-10-29 PROCEDURE — 73502 X-RAY EXAM HIP UNI 2-3 VIEWS: CPT | Performed by: ORTHOPAEDIC SURGERY

## 2025-01-06 ENCOUNTER — OFFICE VISIT (OUTPATIENT)
Facility: CLINIC | Age: 45
End: 2025-01-06
Payer: COMMERCIAL

## 2025-01-06 ENCOUNTER — HOSPITAL ENCOUNTER (OUTPATIENT)
Dept: GENERAL RADIOLOGY | Age: 45
Discharge: HOME | End: 2025-01-06
Attending: ORTHOPAEDIC SURGERY
Payer: COMMERCIAL

## 2025-01-06 DIAGNOSIS — Z87.81 S/P RIGHT HIP FRACTURE: ICD-10-CM

## 2025-01-06 DIAGNOSIS — Z87.81 S/P RIGHT HIP FRACTURE: Primary | ICD-10-CM

## 2025-01-06 PROCEDURE — 99213 OFFICE O/P EST LOW 20 MIN: CPT | Performed by: ORTHOPAEDIC SURGERY

## 2025-01-06 PROCEDURE — 73502 X-RAY EXAM HIP UNI 2-3 VIEWS: CPT | Performed by: ORTHOPAEDIC SURGERY

## 2025-01-06 NOTE — PROGRESS NOTES
[facility] Progress Note    Subjective: Returns to clinic 1 year postop following ORIF right hip femoral neck fracture.  Is doing well from the hip standpoint, states that he is back to normal activities.  He states that he will occasionally tweak the hip when he is walking or moving the hip aggressively, but otherwise not limited.  He notes that he has had to change his shoe wear to wide fit, unsure if swelling after surgery changed the size of his foot.    Objective: Patient appears very comfortable, ambulating with nonantalgic gait.      Imaging: X-rays of the right hip personally viewed, independently interpreted and radiology report read.  Demonstrates cannulated screw fixation of prior femoral neck fracture, no backing out of the screws.  No progression of arthritis, no signs of avascular necrosis.  No fracture line visible.      Assessment/Plan: 44-year-old male 1 year postop status post ORIF right hip femoral neck fracture.  Doing very well, reassured with regards to x-ray findings.  Counseled on expectations and contributions of hip fracture to some of his other complaints.  Advised follow-up with x-ray in 1 year, followed by as needed after that.    Nazario Whittington MD, FAAOS  [facility] Orthopaedic Surgery  Phone [phone]  Fax [phone]

## (undated) DEVICE — STERILE POLYISOPRENE POWDER-FREE SURGICAL GLOVES: Brand: PROTEXIS

## (undated) DEVICE — GOWN SURG AERO CHROME XXL

## (undated) DEVICE — PENCIL TELESCOPE MEGADYNE SE

## (undated) DEVICE — LIGHT HANDLE

## (undated) DEVICE — SOLUTION IRRIG 1000ML 0.9% NACL USP BTL

## (undated) DEVICE — TIP CLEANER: Brand: VALLEYLAB

## (undated) DEVICE — SLEEVE COMPR M KNEE LEN SGL USE KENDALL SCD

## (undated) DEVICE — DRESSING WET L16XW3IN OIL EMUL N ADH CURAD

## (undated) DEVICE — BANDAGE COHESIVE W4INXL5YD TAN E POR SLF ADH

## (undated) DEVICE — STERILE POLYISOPRENE POWDER-FREE SURGICAL GLOVES WITH EMOLLIENT COATING: Brand: PROTEXIS

## (undated) DEVICE — ORTHO CDS-LF: Brand: MEDLINE INDUSTRIES, INC.

## (undated) DEVICE — Device

## (undated) DEVICE — TUBE SUCT STD TRNSPAR RIG W/ BLB TIP RIB 5IN1

## (undated) DEVICE — SUTURE VCRL SZ 2-0 L27IN ABSRB UD L36MM CP-1

## (undated) DEVICE — C-ARM: Brand: UNBRANDED

## (undated) NOTE — ED AVS SNAPSHOT
Mandy Lewis   MRN: [id number]    Department:  [facility] Emergency Department   Date of Visit:  1/28/2019           Disclosure     Insurance plans vary and the physician(s) referred by the ER may not be covered by your plan.  Please contact your tell this physician (or your personal doctor if your instructions are to return to your personal doctor) about any new or lasting problems. The primary care or specialist physician will see patients referred from the [facility] Emergency Department.  Roger Mendoza